# Patient Record
(demographics unavailable — no encounter records)

---

## 2025-07-15 NOTE — HISTORY OF PRESENT ILLNESS
[FreeTextEntry1] : 45 y/o M presents for initial evaluation.   Reason for visit: hemorrhoids  Referred by: Dr. Kerr   PMHx: hep B carrier monitored by his PCP.  PSHx: Meds: denies  Allergies:  NKDA  Family Hx: Liver ca Colonoscopy:   Patient with hx of intermittent constipation seen initially by Dr. Kerr's office. Rectal exam revealed 4-5 mm external hemorrhoid without any rectal bleeding, pain or itchiness.  Requesting further evaluation with CRS.  Currently patient is scheduled for colonoscopy with Dr. Kerr's office.